# Patient Record
Sex: MALE | Race: WHITE | NOT HISPANIC OR LATINO | Employment: FULL TIME | URBAN - METROPOLITAN AREA
[De-identification: names, ages, dates, MRNs, and addresses within clinical notes are randomized per-mention and may not be internally consistent; named-entity substitution may affect disease eponyms.]

---

## 2024-02-15 ENCOUNTER — OFFICE VISIT (OUTPATIENT)
Dept: PRIMARY CARE | Facility: CLINIC | Age: 59
End: 2024-02-15
Payer: COMMERCIAL

## 2024-02-15 VITALS
WEIGHT: 197 LBS | HEIGHT: 70 IN | DIASTOLIC BLOOD PRESSURE: 80 MMHG | SYSTOLIC BLOOD PRESSURE: 120 MMHG | RESPIRATION RATE: 16 BRPM | BODY MASS INDEX: 28.2 KG/M2 | HEART RATE: 52 BPM

## 2024-02-15 DIAGNOSIS — Z12.5 SPECIAL SCREENING FOR MALIGNANT NEOPLASM OF PROSTATE: ICD-10-CM

## 2024-02-15 DIAGNOSIS — N52.9 ERECTILE DYSFUNCTION, UNSPECIFIED ERECTILE DYSFUNCTION TYPE: ICD-10-CM

## 2024-02-15 DIAGNOSIS — N40.1 ENLARGED PROSTATE WITH URINARY OBSTRUCTION: ICD-10-CM

## 2024-02-15 DIAGNOSIS — M16.7 OTHER SECONDARY OSTEOARTHRITIS OF RIGHT HIP: Primary | ICD-10-CM

## 2024-02-15 DIAGNOSIS — Q65.89 CONGENITAL HIP DYSPLASIA (HHS-HCC): ICD-10-CM

## 2024-02-15 DIAGNOSIS — N13.8 ENLARGED PROSTATE WITH URINARY OBSTRUCTION: ICD-10-CM

## 2024-02-15 DIAGNOSIS — E55.9 VITAMIN D INSUFFICIENCY: ICD-10-CM

## 2024-02-15 DIAGNOSIS — M16.11 PRIMARY OSTEOARTHRITIS OF RIGHT HIP: ICD-10-CM

## 2024-02-15 DIAGNOSIS — R00.1 BRADYCARDIA: ICD-10-CM

## 2024-02-15 DIAGNOSIS — K57.20 PERFORATED DIVERTICULUM OF LARGE INTESTINE: ICD-10-CM

## 2024-02-15 PROBLEM — N41.9 PROSTATITIS: Status: ACTIVE | Noted: 2024-02-15

## 2024-02-15 PROBLEM — R97.20 ELEVATED PSA: Status: ACTIVE | Noted: 2024-02-15

## 2024-02-15 PROCEDURE — 99396 PREV VISIT EST AGE 40-64: CPT | Performed by: FAMILY MEDICINE

## 2024-02-15 PROCEDURE — 1036F TOBACCO NON-USER: CPT | Performed by: FAMILY MEDICINE

## 2024-02-15 NOTE — PATIENT INSTRUCTIONS
PT IS WELL ON EXAM AND BASELINE ASYMMETRY TO SMILE NO CRANIAL NERVE DEFICITS AND CLEARED FOR SURGERY.  PLEASE GET LABS DONE AND EKG ALSO.    CALL FOR NEEDS [phone].

## 2024-02-15 NOTE — PROGRESS NOTES
Subjective   Patient ID: Gasper Prince is a 58 y.o. male who presents for Pre-op Exam.  HPI  RT HIP REPLACEMENT SURGERY WITH DR MARAVILLA.    INTERVAL DOING OK YET PROGRESSIVELY WORSE RT HIP SXS.            Surgical Risk Factors:  Hx of blood dyscrasias or easy bleeding? no   Hx of untoward anesthetic reaction? no   Hx of recent steroid use? no   Hx of diabetes? no   Hx of hypertension? no   Hx of laryngeal or tracheal damage? no   Hx of cervical spine pain/fracture/surgery? no   Hx of reaction to iodine or tape or latex? no   Hx of transfusions? no   Use of contacts? no   Use of dentures/partial dentures? no               Review of Systems   All other systems reviewed and are negative.      Objective   Physical Exam  Vitals and nursing note reviewed.   Constitutional:       Appearance: Normal appearance.      Comments: PT IS WELL ON EXAM AND BASELINE ASYMMETRY TO SMILE NO CN DEFICITS AND CLEARED FOR SURGERY         Assessment/Plan   Diagnoses and all orders for this visit:  Other secondary osteoarthritis of right hip  Bradycardia  Special screening for malignant neoplasm of prostate  Vitamin D insufficiency  Congenital hip dysplasia  Primary osteoarthritis of right hip  Enlarged prostate with urinary obstruction  Erectile dysfunction, unspecified erectile dysfunction type  Perforated diverticulum of large intestine             .VS

## 2024-02-20 ENCOUNTER — LAB (OUTPATIENT)
Dept: LAB | Facility: LAB | Age: 59
End: 2024-02-20
Payer: COMMERCIAL

## 2024-02-20 DIAGNOSIS — Z12.5 SPECIAL SCREENING FOR MALIGNANT NEOPLASM OF PROSTATE: ICD-10-CM

## 2024-02-20 DIAGNOSIS — N13.8 ENLARGED PROSTATE WITH URINARY OBSTRUCTION: ICD-10-CM

## 2024-02-20 DIAGNOSIS — M16.7 OTHER SECONDARY OSTEOARTHRITIS OF RIGHT HIP: ICD-10-CM

## 2024-02-20 DIAGNOSIS — R00.1 BRADYCARDIA: ICD-10-CM

## 2024-02-20 DIAGNOSIS — M16.11 PRIMARY OSTEOARTHRITIS OF RIGHT HIP: ICD-10-CM

## 2024-02-20 DIAGNOSIS — N40.1 ENLARGED PROSTATE WITH URINARY OBSTRUCTION: ICD-10-CM

## 2024-02-20 DIAGNOSIS — K57.20 PERFORATED DIVERTICULUM OF LARGE INTESTINE: ICD-10-CM

## 2024-02-20 DIAGNOSIS — E55.9 VITAMIN D INSUFFICIENCY: ICD-10-CM

## 2024-02-20 DIAGNOSIS — N52.9 ERECTILE DYSFUNCTION, UNSPECIFIED ERECTILE DYSFUNCTION TYPE: ICD-10-CM

## 2024-02-20 LAB
25(OH)D3 SERPL-MCNC: 25 NG/ML (ref 30–100)
ALBUMIN SERPL BCP-MCNC: 4.4 G/DL (ref 3.4–5)
ALP SERPL-CCNC: 36 U/L (ref 33–120)
ALT SERPL W P-5'-P-CCNC: 12 U/L (ref 10–52)
ANION GAP SERPL CALC-SCNC: 12 MMOL/L (ref 10–20)
APPEARANCE UR: CLEAR
AST SERPL W P-5'-P-CCNC: 17 U/L (ref 9–39)
BASOPHILS # BLD AUTO: 0.04 X10*3/UL (ref 0–0.1)
BASOPHILS NFR BLD AUTO: 0.6 %
BILIRUB SERPL-MCNC: 0.7 MG/DL (ref 0–1.2)
BILIRUB UR STRIP.AUTO-MCNC: NEGATIVE MG/DL
BUN SERPL-MCNC: 16 MG/DL (ref 6–23)
CALCIUM SERPL-MCNC: 9.4 MG/DL (ref 8.6–10.3)
CHLORIDE SERPL-SCNC: 103 MMOL/L (ref 98–107)
CHOLEST SERPL-MCNC: 228 MG/DL (ref 0–199)
CHOLESTEROL/HDL RATIO: 4.2
CO2 SERPL-SCNC: 27 MMOL/L (ref 21–32)
COLOR UR: YELLOW
CREAT SERPL-MCNC: 1.02 MG/DL (ref 0.5–1.3)
EGFRCR SERPLBLD CKD-EPI 2021: 86 ML/MIN/1.73M*2
EOSINOPHIL # BLD AUTO: 0.14 X10*3/UL (ref 0–0.7)
EOSINOPHIL NFR BLD AUTO: 2.2 %
ERYTHROCYTE [DISTWIDTH] IN BLOOD BY AUTOMATED COUNT: 13.2 % (ref 11.5–14.5)
GLUCOSE SERPL-MCNC: 92 MG/DL (ref 74–99)
GLUCOSE UR STRIP.AUTO-MCNC: NEGATIVE MG/DL
HCT VFR BLD AUTO: 45.1 % (ref 41–52)
HDLC SERPL-MCNC: 53.9 MG/DL
HGB BLD-MCNC: 14.8 G/DL (ref 13.5–17.5)
IMM GRANULOCYTES # BLD AUTO: 0.02 X10*3/UL (ref 0–0.7)
IMM GRANULOCYTES NFR BLD AUTO: 0.3 % (ref 0–0.9)
KETONES UR STRIP.AUTO-MCNC: NEGATIVE MG/DL
LDLC SERPL CALC-MCNC: 154 MG/DL
LEUKOCYTE ESTERASE UR QL STRIP.AUTO: NEGATIVE
LYMPHOCYTES # BLD AUTO: 2.43 X10*3/UL (ref 1.2–4.8)
LYMPHOCYTES NFR BLD AUTO: 38.2 %
MCH RBC QN AUTO: 29.4 PG (ref 26–34)
MCHC RBC AUTO-ENTMCNC: 32.8 G/DL (ref 32–36)
MCV RBC AUTO: 90 FL (ref 80–100)
MONOCYTES # BLD AUTO: 0.67 X10*3/UL (ref 0.1–1)
MONOCYTES NFR BLD AUTO: 10.5 %
NEUTROPHILS # BLD AUTO: 3.06 X10*3/UL (ref 1.2–7.7)
NEUTROPHILS NFR BLD AUTO: 48.2 %
NITRITE UR QL STRIP.AUTO: NEGATIVE
NON HDL CHOLESTEROL: 174 MG/DL (ref 0–149)
NRBC BLD-RTO: 0 /100 WBCS (ref 0–0)
PH UR STRIP.AUTO: 5 [PH]
PLATELET # BLD AUTO: 232 X10*3/UL (ref 150–450)
POTASSIUM SERPL-SCNC: 4.7 MMOL/L (ref 3.5–5.3)
PROT SERPL-MCNC: 7.1 G/DL (ref 6.4–8.2)
PROT UR STRIP.AUTO-MCNC: NEGATIVE MG/DL
PSA SERPL-MCNC: 15.94 NG/ML
RBC # BLD AUTO: 5.03 X10*6/UL (ref 4.5–5.9)
RBC # UR STRIP.AUTO: NEGATIVE /UL
SODIUM SERPL-SCNC: 137 MMOL/L (ref 136–145)
SP GR UR STRIP.AUTO: 1.01
TRIGL SERPL-MCNC: 99 MG/DL (ref 0–149)
TSH SERPL-ACNC: 2.25 MIU/L (ref 0.44–3.98)
UROBILINOGEN UR STRIP.AUTO-MCNC: <2 MG/DL
VLDL: 20 MG/DL (ref 0–40)
WBC # BLD AUTO: 6.4 X10*3/UL (ref 4.4–11.3)

## 2024-02-20 PROCEDURE — 36415 COLL VENOUS BLD VENIPUNCTURE: CPT

## 2024-02-20 PROCEDURE — 85025 COMPLETE CBC W/AUTO DIFF WBC: CPT

## 2024-02-20 PROCEDURE — 80061 LIPID PANEL: CPT

## 2024-02-20 PROCEDURE — 81003 URINALYSIS AUTO W/O SCOPE: CPT

## 2024-02-20 PROCEDURE — 82306 VITAMIN D 25 HYDROXY: CPT

## 2024-02-20 PROCEDURE — 80053 COMPREHEN METABOLIC PANEL: CPT

## 2024-02-20 PROCEDURE — 84153 ASSAY OF PSA TOTAL: CPT

## 2024-02-20 PROCEDURE — 84443 ASSAY THYROID STIM HORMONE: CPT

## 2024-02-21 DIAGNOSIS — E55.9 VITAMIN D DEFICIENCY: Primary | ICD-10-CM

## 2024-02-21 DIAGNOSIS — N13.8 ENLARGED PROSTATE WITH URINARY OBSTRUCTION: ICD-10-CM

## 2024-02-21 DIAGNOSIS — R97.20 ELEVATED PSA: ICD-10-CM

## 2024-02-21 DIAGNOSIS — Z12.5 SPECIAL SCREENING FOR MALIGNANT NEOPLASM OF PROSTATE: ICD-10-CM

## 2024-02-21 DIAGNOSIS — N40.1 ENLARGED PROSTATE WITH URINARY OBSTRUCTION: ICD-10-CM

## 2024-02-21 RX ORDER — CHOLECALCIFEROL (VITAMIN D3) 25 MCG
1000 TABLET ORAL DAILY
Qty: 30 TABLET | Refills: 11 | Status: SHIPPED | OUTPATIENT
Start: 2024-02-21 | End: 2025-02-20

## 2024-02-26 ENCOUNTER — TELEPHONE (OUTPATIENT)
Dept: PRIMARY CARE | Facility: CLINIC | Age: 59
End: 2024-02-26
Payer: COMMERCIAL

## 2024-02-26 NOTE — TELEPHONE ENCOUNTER
----- Message from Ru Field MD sent at 2/21/2024  5:15 PM EST -----  ELEVATED CHOLESTEROL:  LOW D3; PSA IS ELEVATED NEAR 16 NEEDS URO REFERRAL IF NOT DONE ALREADY.  OK: TSH, UA, CBC.

## 2024-10-24 ENCOUNTER — APPOINTMENT (OUTPATIENT)
Dept: UROLOGY | Facility: CLINIC | Age: 59
End: 2024-10-24
Payer: COMMERCIAL

## 2024-10-24 VITALS
HEART RATE: 61 BPM | WEIGHT: 195 LBS | SYSTOLIC BLOOD PRESSURE: 160 MMHG | HEIGHT: 70 IN | DIASTOLIC BLOOD PRESSURE: 83 MMHG | BODY MASS INDEX: 27.92 KG/M2

## 2024-10-24 DIAGNOSIS — N40.1 BENIGN PROSTATIC HYPERPLASIA WITH LOWER URINARY TRACT SYMPTOMS, SYMPTOM DETAILS UNSPECIFIED: Primary | ICD-10-CM

## 2024-10-24 DIAGNOSIS — N40.0 BENIGN PROSTATIC HYPERPLASIA, UNSPECIFIED WHETHER LOWER URINARY TRACT SYMPTOMS PRESENT: ICD-10-CM

## 2024-10-24 DIAGNOSIS — R97.20 ELEVATED PSA: ICD-10-CM

## 2024-10-24 LAB
POC BILIRUBIN, URINE: NEGATIVE
POC BLOOD, URINE: NEGATIVE
POC GLUCOSE, URINE: NEGATIVE MG/DL
POC KETONES, URINE: NEGATIVE MG/DL
POC LEUKOCYTES, URINE: NEGATIVE
POC NITRITE,URINE: NEGATIVE
POC PH, URINE: 6.5 PH
POC PROTEIN, URINE: NEGATIVE MG/DL
POC SPECIFIC GRAVITY, URINE: 1.01
POC UROBILINOGEN, URINE: 0.2 EU/DL

## 2024-10-24 PROCEDURE — 81003 URINALYSIS AUTO W/O SCOPE: CPT | Performed by: UROLOGY

## 2024-10-24 PROCEDURE — 3008F BODY MASS INDEX DOCD: CPT | Performed by: UROLOGY

## 2024-10-24 PROCEDURE — 99204 OFFICE O/P NEW MOD 45 MIN: CPT | Performed by: UROLOGY

## 2024-10-24 PROCEDURE — 1036F TOBACCO NON-USER: CPT | Performed by: UROLOGY

## 2024-10-24 RX ORDER — TAMSULOSIN HYDROCHLORIDE 0.4 MG/1
0.4 CAPSULE ORAL DAILY
Qty: 30 CAPSULE | Refills: 0 | Status: SHIPPED | OUTPATIENT
Start: 2024-10-24 | End: 2024-11-23

## 2024-10-24 NOTE — PROGRESS NOTES
10/24/2024  59-year-old gentleman presents elevated PSA, dysuria.    Patient has no nausea, no vomiting, no fever.    DAVID: 1 cm rectal polyp, moderate enlarged prostate no nodule    Exam: Circumcised, normal penis normal testes bilaterally    We discussed elevated PSA, history of negative prostate biopsy, repeat PSA in 1 month versus prostate biopsy now  We discussed rectal polyp, general surgery consult  We discussed benign prostate hypertrophy with moderate voiding symptoms, Flomax trial 0.4 mg daily  All the questions were answered, the patient expressed understanding and agreed to the plan.    Impression   elevated PSA  Rectal polyp  BPH    Plan  Flomax trial 0.4 mg daily x 30 days  Repeat PSA in 1 month  General Surgery consult for rectal polyp      Chief Complaint   Patient presents with    Elevated PSA     Patient is here today for new patient visit at the request of Ru Field for elevated psa.        Physical Exam     TODAYS LAB RESULTS:    Glucose, Urine  NEGATIVE mg/dl NEGATIVE   POC Bilirubin, Urine  NEGATIVE NEGATIVE   POC Ketones, Urine  NEGATIVE mg/dl NEGATIVE   POC Specific Gravity, Urine  1.005 - 1.035 1.010   POC Blood, Urine  NEGATIVE NEGATIVE   POC PH, Urine  No Reference Range Established PH 6.5   POC Protein, Urine  NEGATIVE, 30 (1+) mg/dl NEGATIVE   POC Urobilinogen, Urine  0.2, 1.0 EU/DL 0.2   Poc Nitrite, Urine  NEGATIVE NEGATIVE   POC Leukocytes, Urine  NEGATIVE NEGATIVE       Prostate Specific Antigen, Screen  Order: [id number]   Status: Final result       Visible to patient: Yes (seen)       Dx: Special screening for malignant neopl...    1 Result Note       1 Patient Communication       1 Follow-up Encounter      Component  Ref Range & Units 8 mo ago   Prostate Specific Antigen,Screen  <=4.00 ng/mL 15.94 High    Resulting Agency [facility]                 ASSESSMENT&PLAN:      IMPRESSIONS:      PSA  2/20/2024 15.94      Surgery  Biopsy negative many years ago in [facility]

## 2024-11-11 ENCOUNTER — APPOINTMENT (OUTPATIENT)
Dept: SURGERY | Facility: CLINIC | Age: 59
End: 2024-11-11
Payer: COMMERCIAL

## 2024-11-11 VITALS
HEIGHT: 70 IN | BODY MASS INDEX: 28.69 KG/M2 | HEART RATE: 61 BPM | DIASTOLIC BLOOD PRESSURE: 84 MMHG | SYSTOLIC BLOOD PRESSURE: 145 MMHG | WEIGHT: 200.4 LBS | OXYGEN SATURATION: 97 %

## 2024-11-11 DIAGNOSIS — K62.1 RECTAL POLYP: Primary | ICD-10-CM

## 2024-11-11 DIAGNOSIS — K64.9 HEMORRHOIDS, UNSPECIFIED HEMORRHOID TYPE: ICD-10-CM

## 2024-11-11 PROCEDURE — 3008F BODY MASS INDEX DOCD: CPT | Performed by: SURGERY

## 2024-11-11 PROCEDURE — 1036F TOBACCO NON-USER: CPT | Performed by: SURGERY

## 2024-11-11 PROCEDURE — 99204 OFFICE O/P NEW MOD 45 MIN: CPT | Performed by: SURGERY

## 2024-11-11 ASSESSMENT — ENCOUNTER SYMPTOMS
COUGH: 0
NAUSEA: 0
HEADACHES: 0
DIZZINESS: 0
CHILLS: 0
SHORTNESS OF BREATH: 0
VOMITING: 0
CONSTIPATION: 0
ABDOMINAL PAIN: 0
PALPITATIONS: 0
BLOOD IN STOOL: 0
DIARRHEA: 0
FEVER: 0

## 2024-11-11 NOTE — H&P (VIEW-ONLY)
GENERAL SURGERY CLINIC NOTE    Gasper Prince   1965   [id number]     History Of Present Illness  Gasper Prince is a 59 y.o. male who presents to the office for evaluation of a rectal polyp. He was evaluated by Urology for an elevated PSA and a small anorectal mass was palpated during the prostate examination. The patient has not undergone colonoscopy. He had diverticulitis in his 40s and underwent a barium enema for evaluation.      Past Medical History  He has a past medical history of Arthritis (01/01/2014).    Surgical History  He has a past surgical history that includes Fracture surgery (01/01/1976); Hernia repair (01/01/1982); Prostate surgery (01/01/2020); Tonsillectomy (01/01/1970); Vasectomy (08/01/1994); and [place] tooth extraction (01/01/2010).  BL inguinal hernia repair as teenager    Medications  Current Outpatient Medications on File Prior to Visit   Medication Sig Dispense Refill    cholecalciferol (Vitamin D3) 25 MCG (1000 UT) tablet Take 1 tablet (1,000 Units) by mouth once daily. 30 tablet 11    tamsulosin (Flomax) 0.4 mg 24 hr capsule Take 1 capsule (0.4 mg) by mouth once daily. Daily before bed 30 capsule 0     No current facility-administered medications on file prior to visit.       Allergies  Ciprofloxacin     Social History  He reports that he has never smoked. He has never used smokeless tobacco. He reports current alcohol use of about 10.0 standard drinks of alcohol per week. He reports that he does not currently use drugs.    Family History  Family History   Problem Relation Name Age of Onset    Hypertension Mother Sonia     Stroke Mother Sonia     Stroke Maternal Grandfather Gasper    No fhx colorectal cancer     Review of Systems   Constitutional:  Negative for chills and fever.   Respiratory:  Negative for cough and shortness of breath.    Cardiovascular:  Negative for chest pain and palpitations.   Gastrointestinal:  Negative for abdominal pain, blood in stool, constipation,  "diarrhea, nausea and vomiting.   Neurological:  Negative for dizziness and headaches.   All other systems reviewed and are negative.      Last Recorded Vitals  Blood pressure 145/84, pulse 61, height 1.778 m (5' 10\"), weight 90.9 kg (200 lb 6.4 oz), SpO2 97%.     Physical Exam  Constitutional:       General: He is not in acute distress.     Appearance: Normal appearance. He is not ill-appearing.   HENT:      Head: Normocephalic and atraumatic.   Cardiovascular:      Rate and Rhythm: Normal rate and regular rhythm.   Pulmonary:      Effort: Pulmonary effort is normal. No respiratory distress.      Breath sounds: Normal breath sounds.   Abdominal:      General: There is no distension.      Palpations: Abdomen is soft.      Tenderness: There is no abdominal tenderness. There is no guarding.   Genitourinary:     Comments: External rectal exam: small, non thrombosed external hemorrhoids in the right anterior and left lateral positions    DAVID: unable to clearly palpate area of concern. Normal rectal tone, normal mucosa  Musculoskeletal:         General: No swelling.   Skin:     General: Skin is warm and dry.   Neurological:      Mental Status: He is alert and oriented to person, place, and time. Mental status is at baseline.   Psychiatric:         Mood and Affect: Mood normal.         Behavior: Behavior normal.          Assessment and Plan  59 y.o. male with an anorectal polyp who has not undergone colonoscopy. I recommend undergoing colonoscopy in the OR and if the polyp/mass is unable to be removed endoscopically, we can perform EUA and excision afterwards. The risks and benefits of undergoing the procedure were discussed. Risks include allergic reactions, heart or lung complications, bleeding, infection, injury to surrounding tissue, and perforation of the colon. The patient expressed understanding and provided informed consent for the procedure.     OR 12/5/24 for colonoscopy, possibly followed by rectal examination " under anesthesia and excision of anorectal mass. DAVID.    Laura Parker MD, [facility]  General Surgery

## 2024-11-11 NOTE — PROGRESS NOTES
GENERAL SURGERY CLINIC NOTE    Gasper Prince   1965   [id number]     History Of Present Illness  Gasper Prince is a 59 y.o. male who presents to the office for evaluation of a rectal polyp. He was evaluated by Urology for an elevated PSA and a small anorectal mass was palpated during the prostate examination. The patient has not undergone colonoscopy. He had diverticulitis in his 40s and underwent a barium enema for evaluation.      Past Medical History  He has a past medical history of Arthritis (01/01/2014).    Surgical History  He has a past surgical history that includes Fracture surgery (01/01/1976); Hernia repair (01/01/1982); Prostate surgery (01/01/2020); Tonsillectomy (01/01/1970); Vasectomy (08/01/1994); and [place] tooth extraction (01/01/2010).  BL inguinal hernia repair as teenager    Medications  Current Outpatient Medications on File Prior to Visit   Medication Sig Dispense Refill    cholecalciferol (Vitamin D3) 25 MCG (1000 UT) tablet Take 1 tablet (1,000 Units) by mouth once daily. 30 tablet 11    tamsulosin (Flomax) 0.4 mg 24 hr capsule Take 1 capsule (0.4 mg) by mouth once daily. Daily before bed 30 capsule 0     No current facility-administered medications on file prior to visit.       Allergies  Ciprofloxacin     Social History  He reports that he has never smoked. He has never used smokeless tobacco. He reports current alcohol use of about 10.0 standard drinks of alcohol per week. He reports that he does not currently use drugs.    Family History  Family History   Problem Relation Name Age of Onset    Hypertension Mother Sonia     Stroke Mother Sonia     Stroke Maternal Grandfather Gasper    No fhx colorectal cancer     Review of Systems   Constitutional:  Negative for chills and fever.   Respiratory:  Negative for cough and shortness of breath.    Cardiovascular:  Negative for chest pain and palpitations.   Gastrointestinal:  Negative for abdominal pain, blood in stool, constipation,  "diarrhea, nausea and vomiting.   Neurological:  Negative for dizziness and headaches.   All other systems reviewed and are negative.      Last Recorded Vitals  Blood pressure 145/84, pulse 61, height 1.778 m (5' 10\"), weight 90.9 kg (200 lb 6.4 oz), SpO2 97%.     Physical Exam  Constitutional:       General: He is not in acute distress.     Appearance: Normal appearance. He is not ill-appearing.   HENT:      Head: Normocephalic and atraumatic.   Cardiovascular:      Rate and Rhythm: Normal rate and regular rhythm.   Pulmonary:      Effort: Pulmonary effort is normal. No respiratory distress.      Breath sounds: Normal breath sounds.   Abdominal:      General: There is no distension.      Palpations: Abdomen is soft.      Tenderness: There is no abdominal tenderness. There is no guarding.   Genitourinary:     Comments: External rectal exam: small, non thrombosed external hemorrhoids in the right anterior and left lateral positions    DAVID: unable to clearly palpate area of concern. Normal rectal tone, normal mucosa  Musculoskeletal:         General: No swelling.   Skin:     General: Skin is warm and dry.   Neurological:      Mental Status: He is alert and oriented to person, place, and time. Mental status is at baseline.   Psychiatric:         Mood and Affect: Mood normal.         Behavior: Behavior normal.          Assessment and Plan  59 y.o. male with an anorectal polyp who has not undergone colonoscopy. I recommend undergoing colonoscopy in the OR and if the polyp/mass is unable to be removed endoscopically, we can perform EUA and excision afterwards. The risks and benefits of undergoing the procedure were discussed. Risks include allergic reactions, heart or lung complications, bleeding, infection, injury to surrounding tissue, and perforation of the colon. The patient expressed understanding and provided informed consent for the procedure.     OR 12/5/24 for colonoscopy, possibly followed by rectal examination " under anesthesia and excision of anorectal mass. DAVID.    Laura Parker MD, [facility]  General Surgery

## 2024-11-15 ENCOUNTER — ANESTHESIA EVENT (OUTPATIENT)
Dept: OPERATING ROOM | Facility: HOSPITAL | Age: 59
End: 2024-11-15
Payer: COMMERCIAL

## 2024-11-15 RX ORDER — ONDANSETRON HYDROCHLORIDE 2 MG/ML
4 INJECTION, SOLUTION INTRAVENOUS ONCE
Status: CANCELLED | OUTPATIENT
Start: 2024-11-15 | End: 2024-11-15

## 2024-11-15 RX ORDER — ALBUTEROL SULFATE 0.83 MG/ML
2.5 SOLUTION RESPIRATORY (INHALATION) ONCE
Status: CANCELLED | OUTPATIENT
Start: 2024-11-15 | End: 2024-11-15

## 2024-11-15 RX ORDER — SODIUM CITRATE AND CITRIC ACID MONOHYDRATE 334; 500 MG/5ML; MG/5ML
30 SOLUTION ORAL ONCE
Status: CANCELLED | OUTPATIENT
Start: 2024-11-15 | End: 2024-11-15

## 2024-11-15 RX ORDER — METOCLOPRAMIDE HYDROCHLORIDE 5 MG/ML
10 INJECTION INTRAMUSCULAR; INTRAVENOUS ONCE
Status: CANCELLED | OUTPATIENT
Start: 2024-11-15 | End: 2024-11-15

## 2024-11-15 RX ORDER — ONDANSETRON HYDROCHLORIDE 2 MG/ML
4 INJECTION, SOLUTION INTRAVENOUS ONCE AS NEEDED
Status: CANCELLED | OUTPATIENT
Start: 2024-11-15

## 2024-11-15 RX ORDER — ACETAMINOPHEN 325 MG/1
975 TABLET ORAL ONCE
Status: CANCELLED | OUTPATIENT
Start: 2024-12-05

## 2024-11-15 RX ORDER — HYDROMORPHONE HYDROCHLORIDE 0.2 MG/ML
0.2 INJECTION INTRAMUSCULAR; INTRAVENOUS; SUBCUTANEOUS EVERY 5 MIN PRN
Status: CANCELLED | OUTPATIENT
Start: 2024-11-15

## 2024-11-15 RX ORDER — SODIUM CHLORIDE 9 MG/ML
20 INJECTION, SOLUTION INTRAVENOUS CONTINUOUS
Status: CANCELLED | OUTPATIENT
Start: 2024-11-15 | End: 2024-11-16

## 2024-11-15 RX ORDER — FAMOTIDINE 10 MG/ML
20 INJECTION INTRAVENOUS ONCE
Status: CANCELLED | OUTPATIENT
Start: 2024-11-15 | End: 2024-11-15

## 2024-11-22 DIAGNOSIS — N40.1 BENIGN PROSTATIC HYPERPLASIA WITH LOWER URINARY TRACT SYMPTOMS, SYMPTOM DETAILS UNSPECIFIED: ICD-10-CM

## 2024-11-22 RX ORDER — TAMSULOSIN HYDROCHLORIDE 0.4 MG/1
0.4 CAPSULE ORAL DAILY
Qty: 90 CAPSULE | Refills: 3 | Status: SHIPPED | OUTPATIENT
Start: 2024-11-22 | End: 2025-11-17

## 2024-11-25 ENCOUNTER — LAB (OUTPATIENT)
Dept: LAB | Facility: LAB | Age: 59
End: 2024-11-25
Payer: COMMERCIAL

## 2024-11-25 ENCOUNTER — PRE-ADMISSION TESTING (OUTPATIENT)
Dept: PREADMISSION TESTING | Facility: HOSPITAL | Age: 59
End: 2024-11-25
Payer: COMMERCIAL

## 2024-11-25 VITALS
SYSTOLIC BLOOD PRESSURE: 141 MMHG | BODY MASS INDEX: 28.99 KG/M2 | HEART RATE: 98 BPM | DIASTOLIC BLOOD PRESSURE: 66 MMHG | OXYGEN SATURATION: 98 % | TEMPERATURE: 97.4 F | WEIGHT: 202.5 LBS | HEIGHT: 70 IN | RESPIRATION RATE: 20 BRPM

## 2024-11-25 DIAGNOSIS — K62.89 RECTAL MASS: ICD-10-CM

## 2024-11-25 DIAGNOSIS — K62.1 RECTAL POLYP: ICD-10-CM

## 2024-11-25 DIAGNOSIS — Z01.818 PRE-OP EVALUATION: Primary | ICD-10-CM

## 2024-11-25 DIAGNOSIS — R97.20 ELEVATED PSA: ICD-10-CM

## 2024-11-25 DIAGNOSIS — N40.0 BENIGN PROSTATIC HYPERPLASIA, UNSPECIFIED WHETHER LOWER URINARY TRACT SYMPTOMS PRESENT: ICD-10-CM

## 2024-11-25 DIAGNOSIS — Z01.818 PRE-OP EVALUATION: ICD-10-CM

## 2024-11-25 LAB
ANION GAP SERPL CALC-SCNC: 11 MMOL/L (ref 10–20)
ATRIAL RATE: 56 BPM
BUN SERPL-MCNC: 15 MG/DL (ref 6–23)
CALCIUM SERPL-MCNC: 9.3 MG/DL (ref 8.6–10.3)
CHLORIDE SERPL-SCNC: 104 MMOL/L (ref 98–107)
CO2 SERPL-SCNC: 28 MMOL/L (ref 21–32)
CREAT SERPL-MCNC: 0.98 MG/DL (ref 0.5–1.3)
EGFRCR SERPLBLD CKD-EPI 2021: 89 ML/MIN/1.73M*2
ERYTHROCYTE [DISTWIDTH] IN BLOOD BY AUTOMATED COUNT: 13 % (ref 11.5–14.5)
GLUCOSE SERPL-MCNC: 95 MG/DL (ref 74–99)
HCT VFR BLD AUTO: 45.1 % (ref 41–52)
HGB BLD-MCNC: 14.8 G/DL (ref 13.5–17.5)
MCH RBC QN AUTO: 29.3 PG (ref 26–34)
MCHC RBC AUTO-ENTMCNC: 32.8 G/DL (ref 32–36)
MCV RBC AUTO: 89 FL (ref 80–100)
NRBC BLD-RTO: 0 /100 WBCS (ref 0–0)
P AXIS: 14 DEGREES
PLATELET # BLD AUTO: 242 X10*3/UL (ref 150–450)
POTASSIUM SERPL-SCNC: 4.5 MMOL/L (ref 3.5–5.3)
PR INTERVAL: 170 MS
PSA SERPL-MCNC: 13.85 NG/ML
Q ONSET: 252 MS
QRS COUNT: 9 BEATS
QRS DURATION: 111 MS
QT INTERVAL: 410 MS
QTC CALCULATION(BAZETT): 396 MS
QTC FREDERICIA: 401 MS
R AXIS: 53 DEGREES
RBC # BLD AUTO: 5.05 X10*6/UL (ref 4.5–5.9)
SODIUM SERPL-SCNC: 138 MMOL/L (ref 136–145)
T AXIS: -3 DEGREES
T OFFSET: 457 MS
VENTRICULAR RATE: 56 BPM
WBC # BLD AUTO: 7.6 X10*3/UL (ref 4.4–11.3)

## 2024-11-25 PROCEDURE — 36415 COLL VENOUS BLD VENIPUNCTURE: CPT

## 2024-11-25 PROCEDURE — 93010 ELECTROCARDIOGRAM REPORT: CPT | Performed by: INTERNAL MEDICINE

## 2024-11-25 PROCEDURE — 85027 COMPLETE CBC AUTOMATED: CPT

## 2024-11-25 PROCEDURE — 80048 BASIC METABOLIC PNL TOTAL CA: CPT

## 2024-11-25 PROCEDURE — 84153 ASSAY OF PSA TOTAL: CPT

## 2024-11-25 PROCEDURE — 93005 ELECTROCARDIOGRAM TRACING: CPT

## 2024-11-25 ASSESSMENT — DUKE ACTIVITY SCORE INDEX (DASI)
CAN YOU WALK A BLOCK OR TWO ON LEVEL GROUND: YES
CAN YOU CLIMB A FLIGHT OF STAIRS OR WALK UP A HILL: YES
CAN YOU TAKE CARE OF YOURSELF (EAT, DRESS, BATHE, OR USE TOILET): YES
CAN YOU WALK INDOORS, SUCH AS AROUND YOUR HOUSE: YES
CAN YOU DO YARD WORK LIKE RAKING LEAVES, WEEDING OR PUSHING A MOWER: YES
CAN YOU PARTICIPATE IN MODERATE RECREATIONAL ACTIVITIES LIKE GOLF, BOWLING, DANCING, DOUBLES TENNIS OR THROWING A BASEBALL OR FOOTBALL: YES
CAN YOU DO HEAVY WORK AROUND THE HOUSE LIKE SCRUBBING FLOORS OR LIFTING AND MOVING HEAVY FURNITURE: YES
CAN YOU RUN A SHORT DISTANCE: NO
CAN YOU DO LIGHT WORK AROUND THE HOUSE LIKE DUSTING OR WASHING DISHES: YES
CAN YOU DO MODERATE WORK AROUND THE HOUSE LIKE VACUUMING, SWEEPING FLOORS OR CARRYING GROCERIES: YES
CAN YOU PARTICIPATE IN STRENOUS SPORTS LIKE SWIMMING, SINGLES TENNIS, FOOTBALL, BASKETBALL, OR SKIING: YES

## 2024-11-25 ASSESSMENT — LIFESTYLE VARIABLES: SMOKING_STATUS: NONSMOKER

## 2024-11-25 NOTE — TELEPHONE ENCOUNTER
----- Message from Daniel Desai sent at 11/25/2024  1:36 PM EST -----  Elevated PSA 13.85.  Plan: Keep appointment

## 2024-11-25 NOTE — PREPROCEDURE INSTRUCTIONS
Medication List            Accurate as of November 25, 2024  8:27 AM. Always use your most recent med list.                cholecalciferol 25 MCG (1000 UT) tablet  Commonly known as: Vitamin D3  Take 1 tablet (1,000 Units) by mouth once daily.     tamsulosin 0.4 mg 24 hr capsule  Commonly known as: Flomax  TAKE 1 CAPSULE (0.4 MG) BY MOUTH ONCE DAILY. DAILY BEFORE BED                              NPO Instructions:    Do not eat any food after midnight the night before your surgery/procedure.    Additional Instructions:     Wear  comfortable loose fitting clothing  Do not use moisturizers, creams, lotions or perfume  All jewelry and valuables should be left at home    Must have a   Follow colon prep instructions

## 2024-11-25 NOTE — CPM/PAT H&P
CPM/PAT Evaluation       Name: Gasper Prince (Gasper Prince)  /Age: 1965/59 y.o.     In-Person       Chief Complaint: Scheduled for rectal exam under anesthesia, excision of rectal lesion, and colonoscopy under MAC anesthesia per Dr. Parker on 24.       Past Medical History:   Diagnosis Date    Arthritis 2014    BPH (benign prostatic hyperplasia)     Diverticulosis        Past Surgical History:   Procedure Laterality Date    FOOT SURGERY      debridement after staff infection    FRACTURE SURGERY  1976    HERNIA REPAIR  1982    HIP ARTHROPLASTY Right     PROSTATE SURGERY  2020    bx    TONSILLECTOMY  1970    VASECTOMY  1994    WISDOM TOOTH EXTRACTION  2010       Patient  reports being sexually active and has had partner(s) who are female. He reports using the following method of birth control/protection: None.    Family History   Problem Relation Name Age of Onset    Hypertension Mother Sonia     Stroke Mother Sonia     Stroke Maternal Grandfather Gasper        Allergies   Allergen Reactions    Ciprofloxacin Dizziness     Triggers Vertigo       Prior to Admission medications    Medication Sig Start Date End Date Taking? Authorizing Provider   tamsulosin (Flomax) 0.4 mg 24 hr capsule TAKE 1 CAPSULE (0.4 MG) BY MOUTH ONCE DAILY. DAILY BEFORE BED 24  Daniel Desai MD   cholecalciferol (Vitamin D3) 25 MCG (1000 UT) tablet Take 1 tablet (1,000 Units) by mouth once daily. 24  Ru Field MD   tamsulosin (Flomax) 0.4 mg 24 hr capsule Take 1 capsule (0.4 mg) by mouth once daily. Daily before bed 10/24/24 11/22/24  Daniel Desai MD        Visit Vitals  /66   Pulse 98   Temp 36.3 °C (97.4 °F) (Temporal)   Resp 20       DASI Risk Score      Flowsheet Row Pre-Admission Testing from 2024 in  [facility]   Can you take care of yourself (eat, dress, bathe, or use toilet)?  2.75 filed at 2024 0813   Can you walk  indoors, such as around your house? 1.75 filed at 11/25/2024 0813   Can you walk a block or two on level ground?  2.75 filed at 11/25/2024 0813   Can you climb a flight of stairs or walk up a hill? 5.5 filed at 11/25/2024 0813   Can you run a short distance? 0 filed at 11/25/2024 0813   Can you do light work around the house like dusting or washing dishes? 2.7 filed at 11/25/2024 0813   Can you do moderate work around the house like vacuuming, sweeping floors or carrying groceries? 3.5 filed at 11/25/2024 0813   Can you do heavy work around the house like scrubbing floors or lifting and moving heavy furniture?  8 filed at 11/25/2024 0813   Can you do yard work like raking leaves, weeding or pushing a mower? 4.5 filed at 11/25/2024 0813   Can you participate in moderate recreational activities like golf, bowling, dancing, doubles tennis or throwing a baseball or football? 6 filed at 11/25/2024 0813   Can you participate in strenous sports like swimming, singles tennis, football, basketball, or skiing? 7.5 filed at 11/25/2024 0813          Caprini DVT Assessment      Flowsheet Row Pre-Admission Testing from 11/25/2024 in  [facility]   DVT Score 3 filed at 11/25/2024 0826   Surgical Factors Prior major surgery <1 month filed at 11/25/2024 0826   BMI 30 or less filed at 11/25/2024 0826          Modified Frailty Index    No data to display       CHADS2 Stroke Risk  Current as of 9 minutes ago        N/A 3 to 100%: High Risk   2 to < 3%: Medium Risk   0 to < 2%: Low Risk     Last Change: N/A          This score determines the patient's risk of having a stroke if the patient has atrial fibrillation.        This score is not applicable to this patient. Components are not calculated.          Revised Cardiac Risk Index      Flowsheet Row Pre-Admission Testing from 11/25/2024 in  [facility]   High-Risk Surgery (Intraperitoneal, Intrathoracic,Suprainguinal vascular) 0 filed at 11/25/2024 0830    History of ischemic heart disease (History of MI, History of positive exercuse test, Current chest paint considered due to myocardial ischemia, Use of nitrate therapy, ECG with pathological Q Waves) 0 filed at 11/25/2024 0830   History of congestive heart failure (pulmonary edemia, bilateral rales or S3 gallop, Paroxysmal nocturnal dyspnea, CXR showing pulmonary vascular redistribution) 0 filed at 11/25/2024 0830   History of cerebrovascular disease (Prior TIA or stroke) 0 filed at 11/25/2024 0830   Pre-operative insulin treatment 0 filed at 11/25/2024 0830   Pre-operative creatinine>2 mg/dl 0 filed at 11/25/2024 0830   Revised Cardiac Risk Calculator 0 filed at 11/25/2024 0830          Apfel Simplified Score      Flowsheet Row Pre-Admission Testing from 11/25/2024 in  [facility]   Smoking status 1 filed at 11/25/2024 0830   History of motion sickness or PONV  0 filed at 11/25/2024 0830   Use of postoperative opioids 0 filed at 11/25/2024 0830   Gender - Female 0=No filed at 11/25/2024 0830   Apfel Simplified Score Calculator 1 filed at 11/25/2024 0830          Risk Analysis Index Results This Encounter    No data found in the last 10 encounters.       Stop Bang Score      Flowsheet Row Pre-Admission Testing from 11/25/2024 in  [facility]   Do you snore loudly? 0 filed at 11/25/2024 0813   Do you often feel tired or fatigued after your sleep? 0 filed at 11/25/2024 0813   Has anyone ever observed you stop breathing in your sleep? 0 filed at 11/25/2024 0813   Do you have or are you being treated for high blood pressure? 0 filed at 11/25/2024 0813   Recent BMI (Calculated) 29.1 filed at 11/25/2024 0813   Is BMI greater than 35 kg/m2? 0=No filed at 11/25/2024 0813   Age older than 50 years old? 1=Yes filed at 11/25/2024 0813   Is your neck circumference greater than 17 inches (Male) or 16 inches (Female)? 0 filed at 11/25/2024 0813   Gender - Male 1=Yes filed at 11/25/2024 0888    STOP-BANG Total Score 2 filed at 11/25/2024 0813          Prodigy: High Risk  Total Score: 8              Prodigy Gender Score          ARISCAT Score for Postoperative Pulmonary Complications    No data to display       Pope Perioperative Risk for Myocardial Infarction or Cardiac Arrest (MARGARITA)    No data to display       Results for orders placed or performed in visit on 11/25/24 (from the past 24 hours)   ECG 12 Lead   Result Value Ref Range    Ventricular Rate 56 BPM    Atrial Rate 56 BPM    UT Interval 170 ms    QRS Duration 111 ms    QT Interval 410 ms    QTC Calculation(Bazett) 396 ms    P Axis 14 degrees    R Axis 53 degrees    T Axis -3 degrees    QRS Count 9 beats    Q Onset 252 ms    T Offset 457 ms    QTC Fredericia 401 ms       Assessment and Plan:     Gastrointestinal: Scheduled for rectal exam under anesthesia, excision of rectal lesion, and colonoscopy under MAC anesthesia per Dr. Parker on 12/5/24.   CBC, BMP ordered. Reviewed and these are WNL and acceptable for upcoming surgery. PSA is elevated @ 13.85, but this is improved slightly from 15.94 a few months ago.   EKG shows SR, rate of 56 bpm.   Reviewed H&P from Dr. Parker dated 11/11/24.   All surgery instructions reviewed with patient by RN. Verbalized understanding.

## 2024-12-02 ENCOUNTER — TELEPHONE (OUTPATIENT)
Dept: SURGERY | Facility: CLINIC | Age: 59
End: 2024-12-02
Payer: COMMERCIAL

## 2024-12-02 DIAGNOSIS — K62.1 RECTAL POLYP: Primary | ICD-10-CM

## 2024-12-02 RX ORDER — POLYETHYLENE GLYCOL 3350, SODIUM SULFATE ANHYDROUS, SODIUM BICARBONATE, SODIUM CHLORIDE, POTASSIUM CHLORIDE 236; 22.74; 6.74; 5.86; 2.97 G/4L; G/4L; G/4L; G/4L; G/4L
4000 POWDER, FOR SOLUTION ORAL ONCE
Qty: 4000 ML | Refills: 0 | Status: ON HOLD | OUTPATIENT
Start: 2024-12-02 | End: 2024-12-05 | Stop reason: HOSPADM

## 2024-12-02 NOTE — TELEPHONE ENCOUNTER
Patient is scheduled for colonoscopy in the OR on 12/5/24.  He was calling to ask which prep he should do?  There is no mention in his note.  He also has nasal congestion but no other symptoms.

## 2024-12-05 ENCOUNTER — HOSPITAL ENCOUNTER (OUTPATIENT)
Dept: OPERATING ROOM | Facility: HOSPITAL | Age: 59
Discharge: HOME | End: 2024-12-05
Payer: COMMERCIAL

## 2024-12-05 ENCOUNTER — HOSPITAL ENCOUNTER (OUTPATIENT)
Facility: HOSPITAL | Age: 59
Setting detail: OUTPATIENT SURGERY
Discharge: HOME | End: 2024-12-05
Attending: SURGERY | Admitting: SURGERY
Payer: COMMERCIAL

## 2024-12-05 ENCOUNTER — ANESTHESIA (OUTPATIENT)
Dept: OPERATING ROOM | Facility: HOSPITAL | Age: 59
End: 2024-12-05
Payer: COMMERCIAL

## 2024-12-05 ENCOUNTER — ANESTHESIA EVENT (OUTPATIENT)
Dept: OPERATING ROOM | Facility: HOSPITAL | Age: 59
End: 2024-12-05
Payer: COMMERCIAL

## 2024-12-05 VITALS
TEMPERATURE: 99.5 F | HEART RATE: 55 BPM | SYSTOLIC BLOOD PRESSURE: 136 MMHG | RESPIRATION RATE: 14 BRPM | OXYGEN SATURATION: 99 % | DIASTOLIC BLOOD PRESSURE: 70 MMHG

## 2024-12-05 DIAGNOSIS — K62.1 RECTAL POLYP: Primary | ICD-10-CM

## 2024-12-05 PROCEDURE — 45381 COLONOSCOPY SUBMUCOUS NJX: CPT | Performed by: SURGERY

## 2024-12-05 PROCEDURE — 3600000005 HC OR TIME - INITIAL BASE CHARGE - PROCEDURE LEVEL FIVE: Performed by: SURGERY

## 2024-12-05 PROCEDURE — 2500000004 HC RX 250 GENERAL PHARMACY W/ HCPCS (ALT 636 FOR OP/ED): Performed by: NURSE ANESTHETIST, CERTIFIED REGISTERED

## 2024-12-05 PROCEDURE — 3700000001 HC GENERAL ANESTHESIA TIME - INITIAL BASE CHARGE: Performed by: SURGERY

## 2024-12-05 PROCEDURE — 7100000009 HC PHASE TWO TIME - INITIAL BASE CHARGE: Performed by: NURSE ANESTHETIST, CERTIFIED REGISTERED

## 2024-12-05 PROCEDURE — 2720000007 HC OR 272 NO HCPCS: Performed by: NURSE ANESTHETIST, CERTIFIED REGISTERED

## 2024-12-05 PROCEDURE — 2720000007 HC OR 272 NO HCPCS: Performed by: SURGERY

## 2024-12-05 PROCEDURE — 2500000001 HC RX 250 WO HCPCS SELF ADMINISTERED DRUGS (ALT 637 FOR MEDICARE OP): Performed by: ANESTHESIOLOGY

## 2024-12-05 PROCEDURE — 45385 COLONOSCOPY W/LESION REMOVAL: CPT | Performed by: SURGERY

## 2024-12-05 PROCEDURE — 2500000004 HC RX 250 GENERAL PHARMACY W/ HCPCS (ALT 636 FOR OP/ED): Performed by: ANESTHESIOLOGY

## 2024-12-05 PROCEDURE — 3700000002 HC GENERAL ANESTHESIA TIME - EACH INCREMENTAL 1 MINUTE: Performed by: SURGERY

## 2024-12-05 PROCEDURE — 7100000010 HC PHASE TWO TIME - EACH INCREMENTAL 1 MINUTE: Performed by: NURSE ANESTHETIST, CERTIFIED REGISTERED

## 2024-12-05 PROCEDURE — 3600000010 HC OR TIME - EACH INCREMENTAL 1 MINUTE - PROCEDURE LEVEL FIVE: Performed by: SURGERY

## 2024-12-05 RX ORDER — ONDANSETRON HYDROCHLORIDE 2 MG/ML
4 INJECTION, SOLUTION INTRAVENOUS ONCE AS NEEDED
Status: DISCONTINUED | OUTPATIENT
Start: 2024-12-05 | End: 2024-12-06 | Stop reason: HOSPADM

## 2024-12-05 RX ORDER — ONDANSETRON HYDROCHLORIDE 2 MG/ML
4 INJECTION, SOLUTION INTRAVENOUS ONCE
Status: COMPLETED | OUTPATIENT
Start: 2024-12-05 | End: 2024-12-05

## 2024-12-05 RX ORDER — SODIUM CHLORIDE 9 MG/ML
20 INJECTION, SOLUTION INTRAVENOUS CONTINUOUS
Status: DISCONTINUED | OUTPATIENT
Start: 2024-12-05 | End: 2024-12-06 | Stop reason: HOSPADM

## 2024-12-05 RX ORDER — ALBUTEROL SULFATE 0.83 MG/ML
2.5 SOLUTION RESPIRATORY (INHALATION) ONCE
Status: DISCONTINUED | OUTPATIENT
Start: 2024-12-05 | End: 2024-12-05 | Stop reason: HOSPADM

## 2024-12-05 RX ORDER — FENTANYL CITRATE 50 UG/ML
INJECTION, SOLUTION INTRAMUSCULAR; INTRAVENOUS AS NEEDED
Status: DISCONTINUED | OUTPATIENT
Start: 2024-12-05 | End: 2024-12-05

## 2024-12-05 RX ORDER — PROPOFOL 10 MG/ML
INJECTION, EMULSION INTRAVENOUS AS NEEDED
Status: DISCONTINUED | OUTPATIENT
Start: 2024-12-05 | End: 2024-12-05

## 2024-12-05 RX ORDER — ACETAMINOPHEN 325 MG/1
975 TABLET ORAL ONCE
Status: COMPLETED | OUTPATIENT
Start: 2024-12-05 | End: 2024-12-05

## 2024-12-05 RX ORDER — METOCLOPRAMIDE HYDROCHLORIDE 5 MG/ML
10 INJECTION INTRAMUSCULAR; INTRAVENOUS ONCE
Status: COMPLETED | OUTPATIENT
Start: 2024-12-05 | End: 2024-12-05

## 2024-12-05 RX ORDER — SODIUM CITRATE AND CITRIC ACID MONOHYDRATE 334; 500 MG/5ML; MG/5ML
30 SOLUTION ORAL ONCE
Status: COMPLETED | OUTPATIENT
Start: 2024-12-05 | End: 2024-12-05

## 2024-12-05 RX ORDER — HYDROMORPHONE HYDROCHLORIDE 0.2 MG/ML
0.2 INJECTION INTRAMUSCULAR; INTRAVENOUS; SUBCUTANEOUS EVERY 5 MIN PRN
Status: DISCONTINUED | OUTPATIENT
Start: 2024-12-05 | End: 2024-12-06 | Stop reason: HOSPADM

## 2024-12-05 RX ORDER — MIDAZOLAM HYDROCHLORIDE 1 MG/ML
INJECTION, SOLUTION INTRAMUSCULAR; INTRAVENOUS AS NEEDED
Status: DISCONTINUED | OUTPATIENT
Start: 2024-12-05 | End: 2024-12-05

## 2024-12-05 RX ORDER — FAMOTIDINE 10 MG/ML
20 INJECTION INTRAVENOUS ONCE
Status: COMPLETED | OUTPATIENT
Start: 2024-12-05 | End: 2024-12-05

## 2024-12-05 SDOH — HEALTH STABILITY: MENTAL HEALTH: CURRENT SMOKER: 1

## 2024-12-05 ASSESSMENT — PAIN SCALES - GENERAL
PAIN_LEVEL: 0
PAINLEVEL_OUTOF10: 0 - NO PAIN

## 2024-12-05 ASSESSMENT — PAIN - FUNCTIONAL ASSESSMENT: PAIN_FUNCTIONAL_ASSESSMENT: 0-10

## 2024-12-05 NOTE — ANESTHESIA POSTPROCEDURE EVALUATION
Patient: Gasper Prince    Procedure Summary       Date: 12/05/24 Room / Location: [facility] OR 02 / Virtual [facility] OR    Anesthesia Start: 0758 Anesthesia Stop: 0856    Procedures:       Rectal examination under anesthesia, excision of anorectal lesion AND  Colonoscopy      . Diagnosis:       Rectal polyp      (Rectal polyp [K62.1])    Surgeons: Laura Parker MD Responsible Provider: DONOVAN Sheikh    Anesthesia Type: MAC ASA Status: 2            Anesthesia Type: MAC    Vitals Value Taken Time   /73 12/05/24 0958   Temp 37.5 12/05/24 0958   Pulse 50 12/05/24 0958   Resp 13 12/05/24 0958   SpO2 93% 12/05/24 0958       Anesthesia Post Evaluation    Patient location during evaluation: PACU  Patient participation: complete - patient participated  Level of consciousness: awake and alert  Pain score: 0  Pain management: adequate  Airway patency: patent  Cardiovascular status: hemodynamically stable  Respiratory status: room air  Hydration status: acceptable  Postoperative Nausea and Vomiting: none      No notable events documented.

## 2024-12-05 NOTE — ADDENDUM NOTE
Encounter addended by: Maren Ramon RN on: 12/5/2024 5:34 PM   Actions taken: Check Out activity completed

## 2024-12-05 NOTE — ADDENDUM NOTE
Encounter addended by: Yaneth Abreu RN on: 12/5/2024 12:37 PM   Actions taken: Check Out activity completed

## 2024-12-05 NOTE — ANESTHESIA PREPROCEDURE EVALUATION
Patient: Gasper Prince    Procedure Information       Date/Time: 12/05/24 0735    Procedures:       Rectal examination under anesthesia, excision of anorectal lesion AND  Colonoscopy - first case for 12/5/24      . - first case for 12/5/24    Location: [facility] OR 02 / Virtual [facility] OR    Surgeons: Laura Parker MD            Relevant Problems   Liver  Daily drinker       Clinical information reviewed:      Problems              NPO Detail:  No data recorded     Physical Exam    Airway  Mallampati: II  TM distance: >3 FB  Neck ROM: full     Cardiovascular - normal exam     Dental    Pulmonary - normal exam     Abdominal            Anesthesia Plan    History of general anesthesia?: yes  History of complications of general anesthesia?: no    ASA 2     MAC     The patient is a current smoker.  Patient was previously instructed to abstain from smoking on day of procedure.  Patient did not smoke on day of procedure.    intravenous induction   Anesthetic plan and risks discussed with patient.  Use of blood products discussed with patient who consented to blood products.    Plan discussed with CRNA.

## 2024-12-06 ASSESSMENT — PAIN SCALES - GENERAL: PAINLEVEL_OUTOF10: 0 - NO PAIN

## 2024-12-09 ENCOUNTER — APPOINTMENT (OUTPATIENT)
Dept: UROLOGY | Facility: CLINIC | Age: 59
End: 2024-12-09
Payer: COMMERCIAL

## 2024-12-09 VITALS
BODY MASS INDEX: 27.92 KG/M2 | WEIGHT: 195 LBS | SYSTOLIC BLOOD PRESSURE: 149 MMHG | HEART RATE: 56 BPM | HEIGHT: 70 IN | DIASTOLIC BLOOD PRESSURE: 86 MMHG

## 2024-12-09 DIAGNOSIS — R97.20 ELEVATED PSA: ICD-10-CM

## 2024-12-09 DIAGNOSIS — N40.1 BENIGN PROSTATIC HYPERPLASIA WITH LOWER URINARY TRACT SYMPTOMS, SYMPTOM DETAILS UNSPECIFIED: Primary | ICD-10-CM

## 2024-12-09 LAB
POC APPEARANCE, URINE: CLEAR
POC BILIRUBIN, URINE: NEGATIVE
POC BLOOD, URINE: NEGATIVE
POC COLOR, URINE: YELLOW
POC GLUCOSE, URINE: NEGATIVE MG/DL
POC KETONES, URINE: NEGATIVE MG/DL
POC LEUKOCYTES, URINE: NEGATIVE
POC NITRITE,URINE: NEGATIVE
POC PH, URINE: 6 PH
POC PROTEIN, URINE: NEGATIVE MG/DL
POC SPECIFIC GRAVITY, URINE: 1.01
POC UROBILINOGEN, URINE: 0.2 EU/DL

## 2024-12-09 PROCEDURE — 81003 URINALYSIS AUTO W/O SCOPE: CPT | Performed by: UROLOGY

## 2024-12-09 PROCEDURE — 99213 OFFICE O/P EST LOW 20 MIN: CPT | Performed by: UROLOGY

## 2024-12-09 PROCEDURE — 3008F BODY MASS INDEX DOCD: CPT | Performed by: UROLOGY

## 2024-12-09 NOTE — PROGRESS NOTES
12/09/2024  No  complaint, ongoing rectal surgery, pathology pending    PSA  11/25/2024 13.85  2/20/2024 15.94    We discussed persistent elevated PSA, prostate biopsy now versus monitoring PSA considering colorectal surgery pending pathology  All the questions were answered, the patient expressed understanding and agreed to the plan.    Impression   elevated PSA  Rectal polyp, pathology pending  BPH    Plan  Continue Flomax trial 0.4 mg daily   PSA in 6-month  Appointment in 6 months  Likely prostate biopsy in the future    Chief Complaint   Patient presents with    Elevated PSA     Pt is here to discuss his psa   Impression   elevated PSA  Rectal polyp  BPH          Physical Exam     TODAYS LAB RESULTS:    POCT UA Automated manually resulted  Order: [id number]   Status: Final result       Visible to patient: Yes (not seen)       Next appt: None       Dx: Elevated PSA    0 Result Notes       Component  Ref Range & Units 09:27 1 mo ago   POC Color, Urine  Straw, Yellow, Light-Yellow Yellow    POC Appearance, Urine  Clear Clear    POC Glucose, Urine  NEGATIVE mg/dl NEGATIVE NEGATIVE   POC Bilirubin, Urine  NEGATIVE NEGATIVE NEGATIVE   POC Ketones, Urine  NEGATIVE mg/dl NEGATIVE NEGATIVE   POC Specific Gravity, Urine  1.005 - 1.035 1.015 1.010   POC Blood, Urine  NEGATIVE NEGATIVE NEGATIVE   POC PH, Urine  No Reference Range Established PH 6.0 6.5   POC Protein, Urine  NEGATIVE, 30 (1+) mg/dl NEGATIVE NEGATIVE   POC Urobilinogen, Urine  0.2, 1.0 EU/DL 0.2 0.2   Poc Nitrite, Urine  NEGATIVE NEGATIVE NEGATIVE   POC Leukocytes, Urine  NEGATIVE NEGATIVE NEGATIVE        tus: Final result       Visible to patient: Yes (seen)       Dx: Elevated PSA; Benign prostatic hyperp...    1 Result Note       1 Follow-up Encounter      Component  Ref Range & Units 2 wk ago   Prostate Specific AG  <=4.00 ng/mL 13.85 High    Resulting Agency [facility]              Narrative  Performed by: BRANNON  The FDA requires that the method used for PSA  assay be reported to the physician. Values obtained with different assay methods must not be used interchangeably. This test was performed at [facility] using the Access [employer] PSA assay is a two-site immunoenzymatic sandwich  assay. The assay is approved for measurement of prostate-specific antigen (PSA)in serum and may be used in conjunction with a digital rectal examination in men 50 years and older as an aid in detection of prostate cancer. 5-Alpha-reductase inhibitors (e.g. Proscar, Finasteride, Avodart, Dutasteride and Amanda) for the treatment of BPH have been shown to lower PSA levels by an average of 50% after 6 months of treatment.       ASSESSMENT&PLAN:      IMPRESSIONS:          10/24/2024  59-year-old gentleman presents elevated PSA, dysuria.     Patient has no nausea, no vomiting, no fever.     DAVID: 1 cm rectal polyp, moderate enlarged prostate no nodule     Exam: Circumcised, normal penis normal testes bilaterally     We discussed elevated PSA, history of negative prostate biopsy, repeat PSA in 1 month versus prostate biopsy now  We discussed rectal polyp, general surgery consult  We discussed benign prostate hypertrophy with moderate voiding symptoms, Flomax trial 0.4 mg daily  All the questions were answered, the patient expressed understanding and agreed to the plan.     Impression   elevated PSA  Rectal polyp  BPH     Plan  Flomax trial 0.4 mg daily x 30 days  Repeat PSA in 1 month  General Surgery consult for rectal polyp             Chief Complaint   Patient presents with    Elevated PSA       Patient is here today for new patient visit at the request of Ru Field for elevated psa.         Physical Exam      TODAYS LAB RESULTS:     Glucose, Urine  NEGATIVE mg/dl NEGATIVE   POC Bilirubin, Urine  NEGATIVE NEGATIVE   POC Ketones, Urine  NEGATIVE mg/dl NEGATIVE   POC Specific Gravity, Urine  1.005 - 1.035 1.010   POC Blood, Urine  NEGATIVE NEGATIVE   POC PH, Urine  No Reference  Range Established PH 6.5   POC Protein, Urine  NEGATIVE, 30 (1+) mg/dl NEGATIVE   POC Urobilinogen, Urine  0.2, 1.0 EU/DL 0.2   Poc Nitrite, Urine  NEGATIVE NEGATIVE   POC Leukocytes, Urine  NEGATIVE NEGATIVE         Prostate Specific Antigen, Screen  Order: [id number]   Status: Final result       Visible to patient: Yes (seen)       Dx: Special screening for malignant neopl...    1 Result Note       1 Patient Communication       1 Follow-up Encounter        Component  Ref Range & Units 8 mo ago   Prostate Specific Antigen,Screen  <=4.00 ng/mL 15.94 High    Resulting Agency [facility]                     ASSESSMENT&PLAN:        IMPRESSIONS:        PSA  11/25/2024 13.85  2/20/2024 15.94        Surgery  Biopsy negative many years ago in [facility]

## 2024-12-13 ENCOUNTER — TELEPHONE (OUTPATIENT)
Facility: CLINIC | Age: 59
End: 2024-12-13
Payer: COMMERCIAL

## 2024-12-13 DIAGNOSIS — D12.6 ADENOMATOUS POLYP OF COLON, UNSPECIFIED PART OF COLON: Primary | ICD-10-CM

## 2024-12-13 LAB
LABORATORY COMMENT REPORT: NORMAL
PATH REPORT.FINAL DX SPEC: NORMAL
PATH REPORT.GROSS SPEC: NORMAL
PATH REPORT.TOTAL CANCER: NORMAL

## 2024-12-13 NOTE — TELEPHONE ENCOUNTER
----- Message from Laura Parker sent at 12/13/2024 10:19 AM EST -----  Please let the patient know the results from his colonoscopy. The sigmoid polyp was the larger one that Dr. Desai felt - that was a pretty early polyp. The flat polyp I could not remove is non cancerous but still needs to be removed. I have referred him to advanced GI - please make sure he sees someone at [facility] or [facility]

## 2024-12-18 NOTE — PROGRESS NOTES
The flat polyp I could not remove is non cancerous but still needs to be removed. I have referred him to advanced GI - please make sure he sees someone at [facility] or [facility]

## 2024-12-19 ENCOUNTER — DOCUMENTATION (OUTPATIENT)
Dept: GASTROENTEROLOGY | Facility: HOSPITAL | Age: 59
End: 2024-12-19
Payer: COMMERCIAL

## 2024-12-19 NOTE — PROGRESS NOTES
Dr. Daniel Davis's office received a referral for this patient from Dr. Laura Parker for polyp removal. Dr. Davis reviewed the referral on 12/18/2024. Per Dr. Davis, OK to schedule a colonoscopy with EMR.    [employer] was notified to call and schedule this patient. Contact Donna Schofield RN at [phone] for any questions.      See below for supporting clinical information from the referral sent by Dr. Laura Parker's office and from medical records:     Colonoscopy  Order# [id number]  Reading physician: Laura Parker MD            Ordering provider: Laura Parker MD      Study date: 12/5/24    Result Information    Status: Final result (Resulted: 12/5/2024 08:55)  Provider Status: Open    Result Text    Result Text  Impression  -          Extensive diverticulosis of moderate severity containing stool in the descending colon and sigmoid colon  -         One polyp measuring 10-19 mm in the transverse colon; performed hot snare with partial piecemeal removal; tattooed  -          Polyp measuring 10-19 mm in the sigmoid colon; performed hot snare removal       Findings  -          Multiple large, extensive diverticula of moderate severity with no inflammation containing stool in the descending colon and sigmoid colon; no bleeding was observed  -          One flat polyp measuring 10-19 mm in the transverse colon; no bleeding was observed; performed hot snare with partial piecemeal removal and retrieved specimen; tattooed with 2 mL of carbon black. Tattooed just distal to the fold. The flat polyp is on the proximal surface of the fold - unable to remove completely  -          Pedunculated and benign-appearing polyp measuring 10-19 mm in the sigmoid colon; performed hot snare with complete en bloc removal and retrieved specimen       Recommendation    Follow up with primary gastroenterologist          Indication  Rectal polyp    Staff    Staff     Role  Laura Parker MD    Proceduralist       Medications  See  Anesthesia Record.      Preprocedure  A history and physical has been performed, and patient medication allergies have been reviewed. The patient's tolerance of previous anesthesia has been reviewed. The risks and benefits of the procedure and the sedation options and risks were discussed with the patient. All questions were answered and informed consent obtained.    Details of the Procedure  The patient underwent monitored anesthesia care, which was administered by an anesthesia professional. The patient's blood pressure, ECG, ETCO2, heart rate, level of consciousness, oxygen and respirations were monitored throughout the procedure. A digital rectal exam was performed. A perianal exam was performed. The scope was introduced through the anus and advanced to the terminal ileum. Retroflexion was performed in the rectum. The quality of bowel preparation was evaluated using the Gary Bowel Preparation Scale with scores of: right colon = 3, transverse colon = 3, left colon = 3. The total BBPS score was 9. Bowel prep was adequate. The patient's estimated blood loss was minimal (<5 mL). The procedure was moderately difficult due to tortuous colon. In response to procedure difficulty, counter pressure was applied, the instrument was changed to a pediatric endoscope, loop reduction was employed and stiffening wire was used. The patient tolerated the procedure well. There were no apparent adverse events.      Events    Procedure Events  Event   Event Time  ENDO SCOPE IN TIME 12/5/2024  8:06 AM  ENDO CECUM REACHED         12/5/2024  8:20 AM  ENDO SCOPE OUT TIME          12/5/2024  8:48 AM       Specimens    ID         Type    Source Tests   Collected by            Time  1 : COLON - TRANSVERSE POLYP         Tissue            COLON - TRANSVERSE POLYP            SURGICAL PATHOLOGY EXAM            Laura Parker MD    12/5/2024 0829  2 : COLON - SIGMOID POLYP   Tissue  COLON - SIGMOID POLYP        SURGICAL PATHOLOGY EXAM      Laura Parker MD            12/5/2024 0845       Procedure Location  [facility] OR  [address] [id number] [phone]      Surgical Pathology Exam: [id number]  Order: [id number]   Collected 12/5/2024 08:29       Status: Final result       Visible to patient: Yes (not seen)       Dx: Rectal polyp    0 Result Notes                Component       FINAL DIAGNOSIS  A. TRANSVERSE COLON, POLYP, POLYPECTOMY:  - Tubular adenoma.    B. SIGMOID COLON, POLYP, POLYPECTOMY:  - Hyperplastic polyp.    Electronically signed by Rinku Taylor MD on 12/13/2024 at 0946               By the signature on this report, the individual or group listed as making the Final Interpretation/Diagnosis certifies that they have reviewed this case.   Gross Description          A: Received in formalin, labeled with the patient's name and hospital number, are 2 fragments of tan, soft tissue aggregating to 0.4 x 0.2 x 0.1 cm. The specimen is submitted in toto in one cassette.  ADARSH   B: Received in formalin, labeled with the patient's name and hospital number, is 1 fragment of tan, soft tissue measuring 1.0 x 0.7 x 0.4 cm. The specimen is submitted in toto in one cassette.  ADARSH

## 2024-12-20 DIAGNOSIS — D12.3 ADENOMA OF TRANSVERSE COLON: Primary | ICD-10-CM

## 2024-12-20 RX ORDER — SODIUM, POTASSIUM,MAG SULFATES 17.5-3.13G
0.51 SOLUTION, RECONSTITUTED, ORAL ORAL SEE ADMIN INSTRUCTIONS
Qty: 354 ML | Refills: 0 | Status: SHIPPED | OUTPATIENT
Start: 2024-12-20

## 2025-01-07 ENCOUNTER — APPOINTMENT (OUTPATIENT)
Dept: GASTROENTEROLOGY | Facility: HOSPITAL | Age: 60
End: 2025-01-07
Payer: COMMERCIAL

## 2025-01-30 ENCOUNTER — ANESTHESIA EVENT (OUTPATIENT)
Dept: GASTROENTEROLOGY | Facility: HOSPITAL | Age: 60
End: 2025-01-30
Payer: COMMERCIAL

## 2025-01-30 ENCOUNTER — ANESTHESIA (OUTPATIENT)
Dept: GASTROENTEROLOGY | Facility: HOSPITAL | Age: 60
End: 2025-01-30
Payer: COMMERCIAL

## 2025-01-30 ENCOUNTER — HOSPITAL ENCOUNTER (OUTPATIENT)
Dept: GASTROENTEROLOGY | Facility: HOSPITAL | Age: 60
Discharge: HOME | End: 2025-01-30
Payer: COMMERCIAL

## 2025-01-30 VITALS
HEART RATE: 58 BPM | RESPIRATION RATE: 17 BRPM | TEMPERATURE: 99.1 F | HEIGHT: 70 IN | BODY MASS INDEX: 28.09 KG/M2 | WEIGHT: 196.21 LBS | OXYGEN SATURATION: 100 % | SYSTOLIC BLOOD PRESSURE: 126 MMHG | DIASTOLIC BLOOD PRESSURE: 70 MMHG

## 2025-01-30 DIAGNOSIS — K63.5 POLYP OF COLON, UNSPECIFIED PART OF COLON, UNSPECIFIED TYPE: ICD-10-CM

## 2025-01-30 PROCEDURE — 3700000002 HC GENERAL ANESTHESIA TIME - EACH INCREMENTAL 1 MINUTE

## 2025-01-30 PROCEDURE — A45381 PR COLONOSCPY,FLEX,W/DIR SUBMUC INJECT: Performed by: NURSE ANESTHETIST, CERTIFIED REGISTERED

## 2025-01-30 PROCEDURE — 2500000004 HC RX 250 GENERAL PHARMACY W/ HCPCS (ALT 636 FOR OP/ED): Performed by: NURSE ANESTHETIST, CERTIFIED REGISTERED

## 2025-01-30 PROCEDURE — 45385 COLONOSCOPY W/LESION REMOVAL: CPT | Performed by: INTERNAL MEDICINE

## 2025-01-30 PROCEDURE — 3700000001 HC GENERAL ANESTHESIA TIME - INITIAL BASE CHARGE

## 2025-01-30 PROCEDURE — 45390 COLONOSCOPY W/RESECTION: CPT | Performed by: INTERNAL MEDICINE

## 2025-01-30 PROCEDURE — 7100000010 HC PHASE TWO TIME - EACH INCREMENTAL 1 MINUTE

## 2025-01-30 PROCEDURE — 7100000009 HC PHASE TWO TIME - INITIAL BASE CHARGE

## 2025-01-30 RX ORDER — LIDOCAINE HYDROCHLORIDE 20 MG/ML
INJECTION, SOLUTION EPIDURAL; INFILTRATION; INTRACAUDAL; PERINEURAL AS NEEDED
Status: DISCONTINUED | OUTPATIENT
Start: 2025-01-30 | End: 2025-01-30

## 2025-01-30 RX ORDER — PROPOFOL 10 MG/ML
INJECTION, EMULSION INTRAVENOUS AS NEEDED
Status: DISCONTINUED | OUTPATIENT
Start: 2025-01-30 | End: 2025-01-30

## 2025-01-30 RX ADMIN — PROPOFOL 50 MG: 10 INJECTION, EMULSION INTRAVENOUS at 09:15

## 2025-01-30 RX ADMIN — PROPOFOL 150 MG: 10 INJECTION, EMULSION INTRAVENOUS at 08:52

## 2025-01-30 RX ADMIN — PROPOFOL 50 MG: 10 INJECTION, EMULSION INTRAVENOUS at 09:10

## 2025-01-30 RX ADMIN — LIDOCAINE HYDROCHLORIDE 50 MG: 20 INJECTION, SOLUTION EPIDURAL; INFILTRATION; INTRACAUDAL; PERINEURAL at 08:52

## 2025-01-30 RX ADMIN — PROPOFOL 50 MG: 10 INJECTION, EMULSION INTRAVENOUS at 09:05

## 2025-01-30 RX ADMIN — PROPOFOL 50 MG: 10 INJECTION, EMULSION INTRAVENOUS at 09:00

## 2025-01-30 SDOH — HEALTH STABILITY: MENTAL HEALTH: CURRENT SMOKER: 1

## 2025-01-30 ASSESSMENT — ENCOUNTER SYMPTOMS: CONSTITUTIONAL NEGATIVE: 1

## 2025-01-30 ASSESSMENT — COLUMBIA-SUICIDE SEVERITY RATING SCALE - C-SSRS
6. HAVE YOU EVER DONE ANYTHING, STARTED TO DO ANYTHING, OR PREPARED TO DO ANYTHING TO END YOUR LIFE?: NO
1. IN THE PAST MONTH, HAVE YOU WISHED YOU WERE DEAD OR WISHED YOU COULD GO TO SLEEP AND NOT WAKE UP?: NO
2. HAVE YOU ACTUALLY HAD ANY THOUGHTS OF KILLING YOURSELF?: NO

## 2025-01-30 ASSESSMENT — PAIN SCALES - GENERAL
PAINLEVEL_OUTOF10: 0 - NO PAIN
PAIN_LEVEL: 0

## 2025-01-30 ASSESSMENT — PAIN - FUNCTIONAL ASSESSMENT: PAIN_FUNCTIONAL_ASSESSMENT: 0-10

## 2025-01-30 NOTE — SIGNIFICANT EVENT
Patient arrived to [facility] after procedure with Anesthesia and procedure RN, procedure discussed, plan reviewed, VSS

## 2025-01-30 NOTE — DISCHARGE INSTRUCTIONS

## 2025-01-30 NOTE — ANESTHESIA POSTPROCEDURE EVALUATION
"Patient: Gasper Prince \"Collin\"    Procedure Summary       Date: 01/30/25 Room / Location: [facility]    Anesthesia Start: 0839 Anesthesia Stop: 0925    Procedure: COLONOSCOPY Diagnosis: Polyp of colon, unspecified part of colon, unspecified type    Scheduled Providers: Daniel Davis DO; Shell Red RN; Oscar Waite MD; Tavares Lyons MA Responsible Provider: Oscar Waite MD    Anesthesia Type: general ASA Status: 2            Anesthesia Type: general    Vitals Value Taken Time   /70 01/30/25 0955   Temp 37.3 °C (99.1 °F) 01/30/25 0922   Pulse 59 01/30/25 0954   Resp 17 01/30/25 0952   SpO2 99 % 01/30/25 0954   Vitals shown include unfiled device data.    Anesthesia Post Evaluation    Patient location during evaluation: bedside  Patient participation: complete - patient participated  Level of consciousness: awake and alert  Pain score: 0  Pain management: adequate  Airway patency: patent  Cardiovascular status: acceptable  Respiratory status: acceptable  Hydration status: acceptable  Postoperative Nausea and Vomiting: none        There were no known notable events for this encounter.    "

## 2025-01-30 NOTE — H&P
History Of Present Illness  Gasper Prince is a 59 y.o. male presenting with a large colon polyp in the transverse colon found on his first colonoscopy referred for EMR by Dr. Parker.     Past Medical History  Past Medical History:   Diagnosis Date    Arthritis 01/01/2014    BPH (benign prostatic hyperplasia)     Colon polyp     Diverticulosis     Hernia, internal 1980     Surgical History  Past Surgical History:   Procedure Laterality Date    FOOT SURGERY      debridement after staff infection    FRACTURE SURGERY  01/01/1976    HERNIA REPAIR  01/01/1982    HIP ARTHROPLASTY Right     POLYPECTOMY  12/05/2024    PROSTATE SURGERY  01/01/2020    bx    TONSILLECTOMY  01/01/1970    VASECTOMY  08/01/1994    WISDOM TOOTH EXTRACTION  01/01/2010     Social History  He reports that he has never smoked. He has never used smokeless tobacco. He reports current alcohol use of about 15.0 standard drinks of alcohol per week. He reports current drug use. Drug: Marijuana.    Family History  Family History   Problem Relation Name Age of Onset    Hypertension Mother Sonia     Stroke Mother Sonia     Colon polyps Mother Sonia     Stroke Maternal Grandfather Gasper     Alcohol abuse Father Simone Prince         Allergies  Allergies   Allergen Reactions    Ciprofloxacin Dizziness     Triggers Vertigo     Review of Systems   Constitutional: Negative.         Physical Exam  Constitutional:       Appearance: Normal appearance. He is normal weight.   Cardiovascular:      Rate and Rhythm: Normal rate and regular rhythm.   Pulmonary:      Effort: Pulmonary effort is normal.      Breath sounds: Normal breath sounds.   Abdominal:      Palpations: Abdomen is soft.   Neurological:      Mental Status: He is alert.   Psychiatric:         Mood and Affect: Mood normal.         Behavior: Behavior normal.         Thought Content: Thought content normal.         Judgment: Judgment normal.          Last Recorded Vitals  Blood pressure 155/80, pulse 63,  "temperature 36 °C (96.8 °F), temperature source Temporal, resp. rate 13, height 1.778 m (5' 10\"), weight 89 kg (196 lb 3.4 oz), SpO2 98%.    Assessment/Plan   Colonoscopy as planned     Daniel Davis,   "

## 2025-01-30 NOTE — ANESTHESIA PREPROCEDURE EVALUATION
Patient: Gasper Prince    Procedure Information       Date/Time: 01/30/25 0900    Scheduled providers: Daniel Davis DO; Shell Red RN; Oscar Waite MD; Tavares Lyons MA    Procedure: COLONOSCOPY    Location: [facility]            Relevant Problems   Anesthesia (within normal limits)      Cardiac (within normal limits)      Pulmonary (within normal limits)      Neuro (within normal limits)      GI (within normal limits)  Diverticulosis  Polyps        /Renal (within normal limits)      Liver (within normal limits)      Endocrine (within normal limits)      Hematology (within normal limits)      Musculoskeletal (within normal limits)      HEENT (within normal limits)      ID (within normal limits)      Skin (within normal limits)       Clinical information reviewed:   Tobacco  Allergies  Meds   Med Hx  Surg Hx   Fam Hx  Soc Hx        NPO Detail:  No data recorded     Physical Exam    Airway  Mallampati: I  TM distance: >3 FB  Neck ROM: full     Cardiovascular - normal exam     Dental    Pulmonary - normal exam     Abdominal - normal exam             Anesthesia Plan    History of general anesthesia?: yes  History of complications of general anesthesia?: no    ASA 2     general     The patient is a current smoker. (Marijuana)  Patient was previously instructed to abstain from smoking on day of procedure.  Patient did not smoke on day of procedure.    intravenous induction   Anesthetic plan and risks discussed with patient.  Use of blood products discussed with patient who consented to blood products.

## 2025-01-31 ASSESSMENT — PAIN SCALES - GENERAL: PAINLEVEL_OUTOF10: 0 - NO PAIN

## 2025-02-05 NOTE — RESULT ENCOUNTER NOTE
GI DR SINGH COLONOSCOPY 2/5/25: POLYPS:  TV COLON TUBULAR ADENOMA, SESSILE SERRATED ADENOMA:  WILL NEED REPEAT IN 3 YRS = 2028.

## 2025-06-09 ENCOUNTER — APPOINTMENT (OUTPATIENT)
Dept: UROLOGY | Facility: CLINIC | Age: 60
End: 2025-06-09
Payer: COMMERCIAL

## 2025-06-12 LAB — PSA SERPL-MCNC: 12.29 NG/ML

## 2025-06-16 ENCOUNTER — APPOINTMENT (OUTPATIENT)
Dept: UROLOGY | Facility: CLINIC | Age: 60
End: 2025-06-16
Payer: COMMERCIAL

## 2025-06-16 DIAGNOSIS — N40.0 BENIGN PROSTATIC HYPERPLASIA, UNSPECIFIED WHETHER LOWER URINARY TRACT SYMPTOMS PRESENT: ICD-10-CM

## 2025-06-16 DIAGNOSIS — R97.20 BPH WITH ELEVATED PSA: ICD-10-CM

## 2025-06-16 DIAGNOSIS — R97.20 ELEVATED PSA: Primary | ICD-10-CM

## 2025-06-16 DIAGNOSIS — N40.1 BENIGN PROSTATIC HYPERPLASIA WITH LOWER URINARY TRACT SYMPTOMS, SYMPTOM DETAILS UNSPECIFIED: ICD-10-CM

## 2025-06-16 DIAGNOSIS — N40.0 BPH WITH ELEVATED PSA: ICD-10-CM

## 2025-06-16 LAB
POC APPEARANCE, URINE: CLEAR
POC BILIRUBIN, URINE: NEGATIVE
POC BLOOD, URINE: NEGATIVE
POC COLOR, URINE: YELLOW
POC GLUCOSE, URINE: NEGATIVE MG/DL
POC KETONES, URINE: NEGATIVE MG/DL
POC LEUKOCYTES, URINE: NEGATIVE
POC NITRITE,URINE: NEGATIVE
POC PH, URINE: 6 PH
POC PROTEIN, URINE: NEGATIVE MG/DL
POC SPECIFIC GRAVITY, URINE: 1.02
POC UROBILINOGEN, URINE: 0.2 EU/DL

## 2025-06-16 PROCEDURE — 99213 OFFICE O/P EST LOW 20 MIN: CPT | Performed by: UROLOGY

## 2025-06-16 PROCEDURE — 1036F TOBACCO NON-USER: CPT | Performed by: UROLOGY

## 2025-06-16 PROCEDURE — 81003 URINALYSIS AUTO W/O SCOPE: CPT | Performed by: UROLOGY

## 2025-06-16 NOTE — PATIENT INSTRUCTIONS
Impression   elevated PSA-stable  Rectal polyp, pathology pending  BPH     Plan  Continue Flomax trial 0.4 mg daily   PSA in 6-month, 12-month  Appointment in 12-month  Prostate biopsy if rising PSA

## 2025-06-16 NOTE — PROGRESS NOTES
06/16/2025  Here for elevated PSA follow-up.  No weight loss no bone pain, voiding well    Patient has no nausea, no vomiting, no fever.    PSA  6/11/2020 512.29  11/25/2024 13.85  2/20/2024 15.94    We discussed elevated PSA, stable, negative prostate biopsy many years ago  We discussed option repeated prostate biopsy.  Versus monitoring PSA.  Patient understand the risk of having prostate cancer  All the questions were answered, the patient expressed understanding and agreed to the plan.    Impression   elevated PSA-stable  Rectal polyp, pathology pending  BPH     Plan  Continue Flomax trial 0.4 mg daily   PSA in 6-month, 12-month  Appointment in 12-month  Prostate biopsy if rising PSA    Chief Complaint   Patient presents with    Elevated PSA     Pt is here today for a 6 month follow up for elevated PSA, BPH and a rectal polyp. Denies any voiding issues, no new issues.         Physical Exam     TODAYS LAB RESULTS:  Lab Results   Component Value Date    PSA 12.29 (H) 06/11/2025    PSA 13.85 (H) 11/25/2024     POCT UA Automated manually resulted  Order: [id number]   Status: Final result       Next appt: None       Dx: BPH with elevated PSA    Test Result Released: Yes (not seen)    0 Result Notes        Component  Ref Range & Units 10:14 6 mo ago 7 mo ago   POC Color, Urine  Straw, Yellow, Light-Yellow Yellow Yellow    POC Appearance, Urine  Clear Clear Clear    POC Glucose, Urine  NEGATIVE mg/dl NEGATIVE NEGATIVE NEGATIVE   POC Bilirubin, Urine  NEGATIVE NEGATIVE NEGATIVE NEGATIVE   POC Ketones, Urine  NEGATIVE mg/dl NEGATIVE NEGATIVE NEGATIVE   POC Specific Gravity, Urine  1.005 - 1.035 1.020 1.015 1.010   POC Blood, Urine  NEGATIVE NEGATIVE NEGATIVE NEGATIVE   POC PH, Urine  No Reference Range Established PH 6.0 6.0 6.5   POC Protein, Urine  NEGATIVE mg/dl NEGATIVE NEGATIVE R NEGATIVE R   POC Urobilinogen, Urine  0.2, 1.0 EU/DL 0.2 0.2 0.2   Poc Nitrite, Urine  NEGATIVE NEGATIVE NEGATIVE NEGATIVE   POC  Leukocytes, Urine  NEGATIVE NEGATIVE NEGATIVE NEGATIVE             Specimen Collected: 06/16/25 10:14 Last Resulted: 06/16/25 10:14       ASSESSMENT&PLAN:      IMPRESSIONS:         12/09/2024  No  complaint, ongoing rectal surgery, pathology pending     PSA  11/25/2024 13.85  2/20/2024 15.94     We discussed persistent elevated PSA, prostate biopsy now versus monitoring PSA considering colorectal surgery pending pathology  All the questions were answered, the patient expressed understanding and agreed to the plan.     Impression   elevated PSA  Rectal polyp, pathology pending  BPH     Plan  Continue Flomax trial 0.4 mg daily   PSA in 6-month  Appointment in 6 months  Likely prostate biopsy in the future          Chief Complaint   Patient presents with    Elevated PSA       Pt is here to discuss his psa   Impression   elevated PSA  Rectal polyp  BPH            Physical Exam      TODAYS LAB RESULTS:     POCT UA Automated manually resulted  Order: [id number]   Status: Final result       Visible to patient: Yes (not seen)       Next appt: None       Dx: Elevated PSA    0 Result Notes          Component  Ref Range & Units 09:27 1 mo ago   POC Color, Urine  Straw, Yellow, Light-Yellow Yellow     POC Appearance, Urine  Clear Clear     POC Glucose, Urine  NEGATIVE mg/dl NEGATIVE NEGATIVE   POC Bilirubin, Urine  NEGATIVE NEGATIVE NEGATIVE   POC Ketones, Urine  NEGATIVE mg/dl NEGATIVE NEGATIVE   POC Specific Gravity, Urine  1.005 - 1.035 1.015 1.010   POC Blood, Urine  NEGATIVE NEGATIVE NEGATIVE   POC PH, Urine  No Reference Range Established PH 6.0 6.5   POC Protein, Urine  NEGATIVE, 30 (1+) mg/dl NEGATIVE NEGATIVE   POC Urobilinogen, Urine  0.2, 1.0 EU/DL 0.2 0.2   Poc Nitrite, Urine  NEGATIVE NEGATIVE NEGATIVE   POC Leukocytes, Urine  NEGATIVE NEGATIVE NEGATIVE         tus: Final result       Visible to patient: Yes (seen)       Dx: Elevated PSA; Benign prostatic hyperp...    1 Result Note       1 Follow-up Encounter         Component  Ref Range & Units 2 wk ago   Prostate Specific AG  <=4.00 ng/mL 13.85 High    Resulting Agency [facility]                Narrative  Performed by: BRANNON  The FDA requires that the method used for PSA assay be reported to the physician. Values obtained with different assay methods must not be used interchangeably. This test was performed at [facility] using the Access [employer] PSA assay is a two-site immunoenzymatic sandwich  assay. The assay is approved for measurement of prostate-specific antigen (PSA)in serum and may be used in conjunction with a digital rectal examination in men 50 years and older as an aid in detection of prostate cancer. 5-Alpha-reductase inhibitors (e.g. Proscar, Finasteride, Avodart, Dutasteride and Amanda) for the treatment of BPH have been shown to lower PSA levels by an average of 50% after 6 months of treatment.         ASSESSMENT&PLAN:        IMPRESSIONS:           10/24/2024  59-year-old gentleman presents elevated PSA, dysuria.     Patient has no nausea, no vomiting, no fever.     DAVID: 1 cm rectal polyp, moderate enlarged prostate no nodule     Exam: Circumcised, normal penis normal testes bilaterally     We discussed elevated PSA, history of negative prostate biopsy, repeat PSA in 1 month versus prostate biopsy now  We discussed rectal polyp, general surgery consult  We discussed benign prostate hypertrophy with moderate voiding symptoms, Flomax trial 0.4 mg daily  All the questions were answered, the patient expressed understanding and agreed to the plan.     Impression   elevated PSA  Rectal polyp  BPH     Plan  Flomax trial 0.4 mg daily x 30 days  Repeat PSA in 1 month  General Surgery consult for rectal polyp                Chief Complaint   Patient presents with    Elevated PSA       Patient is here today for new patient visit at the request of Ru Field for elevated psa.         Physical Exam      TODAYS LAB RESULTS:     Glucose, Urine  NEGATIVE mg/dl  NEGATIVE   POC Bilirubin, Urine  NEGATIVE NEGATIVE   POC Ketones, Urine  NEGATIVE mg/dl NEGATIVE   POC Specific Gravity, Urine  1.005 - 1.035 1.010   POC Blood, Urine  NEGATIVE NEGATIVE   POC PH, Urine  No Reference Range Established PH 6.5   POC Protein, Urine  NEGATIVE, 30 (1+) mg/dl NEGATIVE   POC Urobilinogen, Urine  0.2, 1.0 EU/DL 0.2   Poc Nitrite, Urine  NEGATIVE NEGATIVE   POC Leukocytes, Urine  NEGATIVE NEGATIVE         Prostate Specific Antigen, Screen  Order: [id number]   Status: Final result       Visible to patient: Yes (seen)       Dx: Special screening for malignant neopl...    1 Result Note       1 Patient Communication       1 Follow-up Encounter        Component  Ref Range & Units 8 mo ago   Prostate Specific Antigen,Screen  <=4.00 ng/mL 15.94 High    Resulting Agency [facility]                     ASSESSMENT&PLAN:        IMPRESSIONS:        PSA  6/11/2020 512.29  11/25/2024 13.85  2/20/2024 15.94        Surgery  Biopsy negative many years ago in [facility]

## (undated) DEVICE — COVER HANDLE LIGHT, STERIS, BLUE, STERILE

## (undated) DEVICE — NEEDLE, SAFETY, 25 GA X 1.5 IN

## (undated) DEVICE — LUBRICANT, SURGICAL, FOIL PACK, STERILE, 5 GRAM

## (undated) DEVICE — PREP TRAY, DRY SKIN SCRUB KIT

## (undated) DEVICE — SOLUTION, IRRIGATION, STERILE WATER, 1000 ML, POUR BOTTLE

## (undated) DEVICE — GLOVE, SURGICAL, PROTEXIS PI BLUE W/NEUTHERA, 7.5, PF, LF